# Patient Record
Sex: FEMALE | Race: WHITE
[De-identification: names, ages, dates, MRNs, and addresses within clinical notes are randomized per-mention and may not be internally consistent; named-entity substitution may affect disease eponyms.]

---

## 2017-01-18 ENCOUNTER — HOSPITAL ENCOUNTER (OUTPATIENT)
Dept: HOSPITAL 75 - RAD | Age: 37
End: 2017-01-18
Attending: NURSE PRACTITIONER
Payer: COMMERCIAL

## 2017-01-18 DIAGNOSIS — N63: Primary | ICD-10-CM

## 2017-01-18 PROCEDURE — 76641 ULTRASOUND BREAST COMPLETE: CPT

## 2017-01-18 NOTE — DIAGNOSTIC IMAGING REPORT
EXAMINATION:

Bilateral diagnostic mammogram with a Computer Aided Detection

(CAD) system.



INDICATION:

Right breast pain.



COMPARISON:

Right breast mammogram from 06/19/2012.



FINDINGS:

There is a circumscribed nodule measuring about 1 cm seen in the

outer aspect of the right breast, probably correlating with the

ultrasound abnormality, probably a fibroadenoma and similar to

the 2012 exam. The lesion is better seen on the MLO view and is

not well seen on the CC projection. There is also a well defined

mass inferior to the breast seen in the right true lateral

projection with a minimal component seen on the CC view. This is

better seen on the prior CC projection. It is close to the skin

and may relate to a sebaceous cyst. It measures about 1.5 cm.



The background parenchyma is heterogeneously dense which could

obscure an underlying abnormality. Scattered benign appearing

calcifications are seen.



IMPRESSION:

Dense breasts. No suspicious mass is identified. Annual

screening mammograms at the age of 40 are recommended.



ACR BI-RADS Category 2: Benign findings.

Result letter will be mailed to the patient.

Note: At least 10% of breast cancer is not imaged by mammography.



Dictated by:



Dictated on workstation # [id number]

## 2017-01-18 NOTE — DIAGNOSTIC IMAGING REPORT
EXAMINATION:

Right breast ultrasound.



INDICATION:

Right breast nodule. Breast pain.



COMPARISON STUDY:

04/22/2013.



FINDINGS:

At the 4 o'clock zone 6 cm from the nipple, there is a 1.3 x 0.7

x 1.1 cm mass with lobulated margins and hypoechoic internal

echotexture with slight heterogeneity. There is no shadowing and

it is wider than tall. No internal color Doppler is seen. The

lesion is probably the same lesion marked at the 3 o'clock zone

previously on the 2013 exam and measured 1.2 cm. It is

essentially stable. No other lesion is identified in the

four-quadrant or retroareolar regions.



IMPRESSION:

There is a 1.3 cm lobulated hypoechoic mass at the 4 o'clock

zone 6 cm from the nipple which has not significantly changed

from the 2013 exam, suggestive of benign etiology such as a

fibroadenoma. A correlative mammogram is suggested.



ACR BI-RADS Category 0: Incomplete. (Needs additional imaging

evaluation).



Dictated by:



Dictated on workstation # [id number]

## 2018-02-24 ENCOUNTER — HOSPITAL ENCOUNTER (OUTPATIENT)
Dept: HOSPITAL 75 - LABNPT | Age: 38
End: 2018-02-24
Attending: NURSE PRACTITIONER
Payer: COMMERCIAL

## 2018-02-24 DIAGNOSIS — R30.0: Primary | ICD-10-CM

## 2018-02-24 PROCEDURE — 87186 SC STD MICRODIL/AGAR DIL: CPT

## 2018-02-24 PROCEDURE — 87077 CULTURE AEROBIC IDENTIFY: CPT

## 2018-02-24 PROCEDURE — 87088 URINE BACTERIA CULTURE: CPT

## 2018-08-01 ENCOUNTER — HOSPITAL ENCOUNTER (OUTPATIENT)
Dept: HOSPITAL 75 - RAD | Age: 38
End: 2018-08-01
Attending: NURSE PRACTITIONER
Payer: COMMERCIAL

## 2018-08-01 DIAGNOSIS — E04.2: ICD-10-CM

## 2018-08-01 DIAGNOSIS — N63.14: ICD-10-CM

## 2018-08-01 DIAGNOSIS — N63.13: Primary | ICD-10-CM

## 2018-08-01 PROCEDURE — 76536 US EXAM OF HEAD AND NECK: CPT

## 2018-08-01 PROCEDURE — 77066 DX MAMMO INCL CAD BI: CPT

## 2018-08-01 NOTE — DIAGNOSTIC IMAGING REPORT
INDICATION: 

Palpable lump in the inferior right breast.



COMPARISON:    

01/18/2017 and 06/19/2012.



TECHNIQUE: 

2D and 3D bilateral diagnostic mammography was performed with

CAD.



FINDINGS:

Both breasts are heterogeneously dense, limiting the sensitivity

of mammography. There is a circumscribed mass just below the skin

surface at the area of palpable abnormality just below the right

breast. This has been present on prior mammograms dating back to

2012. This may represent a sebaceous cyst. It measures 2.4 cm in

size. The remainder of the breasts is unremarkable. No

malignant-appearing microcalcifications are seen. The axillae are

unremarkable.



IMPRESSION:  

Circumscribed mass in the superficial tissues at the area of

palpable abnormality inferior to the right breast. This most

likely represents a sebaceous cyst. Further evaluation of this

area with ultrasound is recommended.



ACR BI-RADS Category 0: Incomplete. (Needs additional imaging

evaluation).

Result letter will be mailed to the patient.

Note: At least 10% of breast cancer is not imaged by mammography.



Dictated by: 



  Dictated on workstation # [id number]

## 2018-08-01 NOTE — DIAGNOSTIC IMAGING REPORT
INDICATION: Palpable lump in the neck.



FINDINGS: The right lobe of the thyroid measures 5.0 x 1.5 x 1.7

cm and the left lobe measures 5.1 x 1.3 x 1.5 cm. There are

numerous hypoechoic subcentimeter nodules within both lobes of

the thyroid gland measuring range from 3-7 mm. No dominant

thyroid mass is identified. At the area of palpable abnormality,

there is a circumscribed hyperechoic mass just below the skin

surface anterior to the thyroid isthmus measuring 2.0 x 0.8 x 1.5

cm. No internal vascularity is seen.



IMPRESSION:

1. Multiple bilateral subcentimeter thyroid nodules. No dominant

thyroid mass is detected.

2. Circumscribed, hyperechoic homogeneous mass in the right

paramidline of the neck immediately anterior to the thyroid. This

appears solid but has fairly benign features. Exact etiology is

indeterminate. Close followup is recommended. Consideration could

be given to excisional biopsy.



Dictated by: 



  Dictated on workstation # [id number]

## 2018-08-01 NOTE — DIAGNOSTIC IMAGING REPORT
INDICATION: 

Palpable lump in the inferior right breast. This study is

performed for further evaluation.



COMPARISON: 

Correlation is made with the diagnostic mammogram from earlier

this same day as well as the prior right breast ultrasound from

01/18/2017.



FINDINGS:

There is a circumscribed hypoechoic mass just below the skin

surface at the 6 o'clock location of the right breast 7 cm from

the nipple. This does correspond to the palpable abnormality.

This measures 2.7 x 1.2 x 2.3 cm. No internal vascularity is

seen. There is posterior acoustic enhancement present. In

addition, the slightly lobulated hypoechoic mass at the 4 clock

location of the right breast 6 cm from the nipple is again noted

measuring 13 mm x 5 mm x 10 mm. This is similar in size to the

prior study and is most consistent with a fibroadenoma. No other

masses are seen.



IMPRESSION:   

1. Stable lobulated hypoechoic nodule at the 4 o'clock location

of the right breast which is suggestive of a fibroadenoma.



2. Ovoid, circumscribed hypoechoic mass at the 6 o'clock location

of the right breast 7 cm from the nipple. The features are most

suggestive of either a fibroadenoma or sebaceous cyst. This does

appear to be benign.



3. No other abnormality is seen.



Dictated by: 



  Dictated on workstation # [id number]

## 2018-09-10 ENCOUNTER — HOSPITAL ENCOUNTER (OUTPATIENT)
Dept: HOSPITAL 75 - PREOP | Age: 38
Discharge: HOME | End: 2018-09-10
Attending: SURGERY
Payer: COMMERCIAL

## 2018-09-10 VITALS — BODY MASS INDEX: 21.98 KG/M2 | HEIGHT: 68 IN | WEIGHT: 145 LBS

## 2018-09-10 DIAGNOSIS — Z01.818: Primary | ICD-10-CM

## 2018-09-13 ENCOUNTER — HOSPITAL ENCOUNTER (OUTPATIENT)
Dept: HOSPITAL 75 - SDC | Age: 38
Discharge: HOME | End: 2018-09-13
Attending: SURGERY
Payer: COMMERCIAL

## 2018-09-13 VITALS — DIASTOLIC BLOOD PRESSURE: 73 MMHG | SYSTOLIC BLOOD PRESSURE: 109 MMHG

## 2018-09-13 VITALS — WEIGHT: 145 LBS | HEIGHT: 68 IN | BODY MASS INDEX: 21.98 KG/M2

## 2018-09-13 VITALS — DIASTOLIC BLOOD PRESSURE: 79 MMHG | SYSTOLIC BLOOD PRESSURE: 119 MMHG

## 2018-09-13 VITALS — SYSTOLIC BLOOD PRESSURE: 116 MMHG | DIASTOLIC BLOOD PRESSURE: 77 MMHG

## 2018-09-13 VITALS — SYSTOLIC BLOOD PRESSURE: 109 MMHG | DIASTOLIC BLOOD PRESSURE: 73 MMHG

## 2018-09-13 DIAGNOSIS — L72.0: Primary | ICD-10-CM

## 2018-09-13 PROCEDURE — 87081 CULTURE SCREEN ONLY: CPT

## 2018-09-13 PROCEDURE — 84703 CHORIONIC GONADOTROPIN ASSAY: CPT

## 2018-09-13 PROCEDURE — 88304 TISSUE EXAM BY PATHOLOGIST: CPT

## 2018-09-13 NOTE — DISCHARGE INST-SIMPLE/STANDARD
Discharge Inst-Standard


Discharge Medications


New, Converted or Re-Newed RX:  RX on Chart





Patient Instructions/Follow Up


Plan of Care/Instructions/FU:  


Dressings off in 48 hours. F/U if needed. We would call once path available


Activity as Tolerated:  Yes


Discharge Diet:  No Restrictions











ROXANNA NGUYEN MD Sep 13, 2018 11:48

## 2018-09-13 NOTE — OPERATIVE REPORT
Operative Report


Date of Procedure/Surgery


Sep 13, 2018


Surgeon (s)


ROXANNA NGUYEN MD


Assistant (s):  N/A





Post-Operative Diagnosis





same





Procedure Performed





1.excision of pretracheal lump


2.  Excision of sebaceous cyst from right inframammary region





Description of Procedure


Anesthesia Type:  General


Estimated blood loss (mL):  minimal


Specimen(s) collected/removed


pretracheal lump and sebaceous cyst


Description of the Procedure


Indication for the procedures: This lady presented with a palpable lump over 

the right inframammary region with a negative mammogram and a 2 cm lesion over 

the pretracheal region.  With regard to the latter, evaluation was negative for 

any thyroglossal cyst.  However, the possibility of this condition was 

discussed explicitly..  She was offered excision of both lesions.  Informed 

consent was obtained after reviewing the operative details and complications of 

wound infection and recurrence.





Description of the procedures: She was placed supine on the operative table and 

general anesthesia induced.  Antibiotics were administered intravenously as 

prophylaxis against wound infection.  Sequential compression devices were 

placed around her legs, to minimize the risk of venous thrombosis.





Her neck and right inframammary region was prepared and draped in the usual 

sterile manner.





1.Excision of lesion from the pretracheal region: Preemptive analgesia was 

established using 0.5 percent Marcaine with epinephrine.  A 3 cm transverse 

incision was made and the lesion, which was quite superficial and anterior to 

the strap muscles without any obvious communication to the thyroid gland, was 

isolated and excised intact.  It was sent for histological examination.  

Hemostasis was achieved using cautery and incision closed using 3-0 Vicryl for 

the definitive 4-0 Vicryl for skin, in a subcuticular fashion.





2.Excision of sebaceous cystright inframammary region: preemptive analgesia 

was established in a similar fashion.  An elliptical incision, about 4 cm in 

length was made, in a transverse fashion and the lump, which are not be a 

classic sebaceous cyst, excised.  Hemostasis was achieved using cautery and the 

incision closed using 3-0 Vicryl for the subcutaneous tissue and 4-0 Vicryl for 

skin, in a subcuticular fashion.  Steri-Strips and a nonadherent dressing were 

then applied.





She tolerated the procedures well, was extubated in the operating room and 

taken to the recovery room in a stable condition.


Findings of the Procedure


See op report





Allergies and Home Medications


Allergies


Coded Allergies:  


     Penicillins (Verified  Allergy, Unknown, HIVES, 9/10/18)


     Sulfa (Sulfonamide Antibiotics) (Verified  Allergy, Unknown, HIVES, 9/10/18

)





Home Medications


Spironolactone 50 Mg Tablet, 50 MG PO BID, (Reported)


Tramadol HCl 50 Mg Tablet, 50 MG PO Q12H PRN for PAIN-MILD TO MODERATE


   Prescribed by: ROXANNA NGUYEN on 9/13/18 1147





Patient Home Medication List


Home Medication List Reviewed:  Yes











ROXANNA NGUEYN MD Sep 13, 2018 12:41

## 2018-09-13 NOTE — ANESTHESIA-GENERAL POST-OP
General


Patient Condition


Mental Status/LOC:  Same as Preop


Cardiovascular:  Satisfactory


Nausea/Vomiting:  Absent


Respiratory:  Satisfactory


Pain:  Controlled


Complications:  Absent





Post Op Complications


Complications


None





Follow Up Care/Instructions


Patient Instructions


None needed.





Anesthesia/Patient Condition


Patient Condition


Patient is doing well, no complaints, stable vital signs, no apparent adverse 

anesthesia problems.   


No complications reported per nursing.











LADI BURGOS CRNA Sep 13, 2018 13:53

## 2018-09-13 NOTE — PROGRESS NOTE-PRE OPERATIVE
Pre-Operative Progress Note


H&P Reviewed


The H&P was reviewed, patient examined and no changes noted.


Date Seen by Provider:  Aug 16, 2018


Time Seen by Provider:  11:00


Date H&P Reviewed:  Sep 13, 2018


Time H&P Reviewed:  10:46


Pre-Operative Diagnosis:  Pre-tracheal lump. left breast lump











ROXANNA NGUYEN MD Sep 13, 2018 10:46

## 2023-08-25 ENCOUNTER — HOSPITAL ENCOUNTER (OUTPATIENT)
Dept: HOSPITAL 75 - RAD | Age: 43
Discharge: STILL ADMITTED OR ADMINISTRATIVE | End: 2023-08-25
Attending: NURSE PRACTITIONER
Payer: COMMERCIAL

## 2023-08-25 DIAGNOSIS — Z12.31: Primary | ICD-10-CM

## 2023-08-25 PROCEDURE — 77067 SCR MAMMO BI INCL CAD: CPT

## 2023-08-25 PROCEDURE — 77063 BREAST TOMOSYNTHESIS BI: CPT

## 2023-08-28 NOTE — DIAGNOSTIC IMAGING REPORT
INDICATION: 

Routine screening.



COMPARISON: 

10/18/2021 and 08/23/2019.



TECHNIQUE: 

2D and 3D bilateral screening mammography was performed with CAD.



FINDINGS:

Both breasts are heterogeneously dense, limiting the sensitivity

of mammography. The parenchymal pattern is stable. No dominant

mass or malignant-appearing microcalcifications are seen. There

are benign calcifications bilaterally. The axillae are

unremarkable.



IMPRESSION: 

No mammographic features suspicious for malignancy are

identified.



ACR BI-RADS Category 2: Benign findings.

Result letter will be mailed to the patient.

Note: At least 10% of breast cancer is not imaged by mammography.



Dictated by: 



  Dictated on workstation # [id number]

## 2023-10-11 ENCOUNTER — HOSPITAL ENCOUNTER (OUTPATIENT)
Dept: HOSPITAL 75 - PREOP | Age: 43
LOS: 2 days | Discharge: HOME | End: 2023-10-13
Attending: SURGERY
Payer: COMMERCIAL

## 2023-10-11 VITALS — HEIGHT: 68.98 IN | BODY MASS INDEX: 23.25 KG/M2 | WEIGHT: 156.97 LBS

## 2023-10-11 DIAGNOSIS — Z01.818: Primary | ICD-10-CM

## 2023-10-18 ENCOUNTER — HOSPITAL ENCOUNTER (OUTPATIENT)
Dept: HOSPITAL 75 - ENDO | Age: 43
Discharge: HOME | End: 2023-10-18
Attending: SURGERY
Payer: COMMERCIAL

## 2023-10-18 VITALS — SYSTOLIC BLOOD PRESSURE: 110 MMHG | DIASTOLIC BLOOD PRESSURE: 68 MMHG

## 2023-10-18 VITALS — DIASTOLIC BLOOD PRESSURE: 68 MMHG | SYSTOLIC BLOOD PRESSURE: 110 MMHG

## 2023-10-18 VITALS — DIASTOLIC BLOOD PRESSURE: 54 MMHG | SYSTOLIC BLOOD PRESSURE: 84 MMHG

## 2023-10-18 VITALS — WEIGHT: 156.97 LBS | BODY MASS INDEX: 23.25 KG/M2 | HEIGHT: 68.98 IN

## 2023-10-18 VITALS — SYSTOLIC BLOOD PRESSURE: 89 MMHG | DIASTOLIC BLOOD PRESSURE: 55 MMHG

## 2023-10-18 VITALS — SYSTOLIC BLOOD PRESSURE: 115 MMHG | DIASTOLIC BLOOD PRESSURE: 79 MMHG

## 2023-10-18 DIAGNOSIS — Z12.11: Primary | ICD-10-CM

## 2023-10-18 DIAGNOSIS — K64.8: ICD-10-CM

## 2023-10-18 DIAGNOSIS — K64.4: ICD-10-CM

## 2023-10-18 NOTE — PROGRESS NOTE-POST OPERATIVE
Post-Operative Progess Note


Surgeon (s)/Assistant (s)


Surgeon


ANDRESSA GERARD MD


Assistant:  none





Pre-Operative Diagnosis


screening colo





Post-Operative Diagnosis





mild chronic stage 2 ext and int hemorrhoids.





Procedure & Operative Findings


Date of Procedure


10/18/23


Procedure Performed/Findings


colonoscopy


Anesthesia Type


mac





Estimated Blood Loss


Estimated blood loss (mL):  minimal





Specimens/Packing


Specimens Removed


none











ANDRESSA GERARD MD                Oct 18, 2023 12:03

## 2023-10-18 NOTE — OPERATIVE REPORT
DATE OF SERVICE: 10/18/2023



ATTENDING PRIMARY CARE PHYSICIAN:Shea Dan MD



PREOPERATIVE DIAGNOSIS:

Screening colonoscopy.



POSTOPERATIVE DIAGNOSES:

Mild chronic stage II external and internal hemorrhoids.



PROCEDURE:

Colonoscopy.



SURGEON:

Andressa Gerard MD



ANESTHESIA:

Monitored anesthesia care.



ESTIMATED BLOOD LOSS:

Minimal.



FINDINGS:

Mild chronic stage II external and internal hemorrhoids.



DISPOSITION:

The patient tolerated the procedure well.



INDICATIONS:

The patient is a 43-year-old female referred over to us for screening 

colonoscopy.  She is otherwise doing well, does not report any major issues with

diarrhea, nor constipation as well as no red blood per rectum, nor any dark 

tarry stools.  She also does not report any family history of colon cancer.



DESCRIPTION OF PROCEDURE:

The patient was brought to the endoscopy suite and laid in the left lateral 

decubitus position.  After adequate IV pain and sedative medications and 

monitored anesthesia care, a digital rectal examination was performed.  Mild 

chronic stage II external and internal hemorrhoids identified, not actively 

edematous nor inflamed and no bleeding.  Normal sphincter tone was felt and 

there were no palpable masses.



The endoscope was then intubated into the anus, rectum gently insufflated.  The 

endoscope was then advanced through the valves of Martin of the rectum with no 

polyps or any neoplasms identified.  We then proceeded to the sigmoid colon 

where no diverticulosis identified.  The endoscope was then advanced through the

remainder of the descending, transverse and ascending colon to the cecum, which 

were normal.  No polyps or any neoplasms identified.  The endoscope was then 

slowly withdrawn while taking a second look and suctioning of residual air with 

no additional findings.



The patient tolerated the procedure well.  We will recommend continued medical 

management with a high-fiber diet with addition of a fiber supplement, which 

should equal or exceed 25 grams daily as well as significant amounts of water to

promote soft consistency stools on a daily basis.  She is asymptomatic, she does

not need another colonoscopy for another 10 years.





Job ID: [id number]

DocumentID: [id number]

Dictated Date: 10/18/2023 12:00:12

Transcription Date: 10/18/2023 19:40:00

Dictated By: ANDRESSA GERARD MD

## 2023-10-18 NOTE — PROGRESS NOTE-PRE OPERATIVE
Pre-Operative Progress Note


Date of Available H&P:  Oct 18, 2023


Date H&P Reviewed:  Oct 18, 2023


Time H&P Reviewed:  10:30


History & Physical:  No changes noted


Pre-Operative Diagnosis:  screening colo











ANDRESSA GERARD MD                Oct 18, 2023 11:06

## 2023-10-18 NOTE — DISCHARGE INST-SURGICAL
D/C Lap Instructions-RAJANI


Follow Up 





Activity as tolerated








High Fiber Diet 25g or more per day





Avoid Alcohol, Caffeine, Spicy Greasy and Acid foods.





Drink 64 fluid oz or more of fluids per day.





Symptoms to Report: Fever over 101 degree F, Nausea/Vomiting 


If any problems/questions: Contact your physician or go to Emergency Room











ANDRESSA GERARD MD                Oct 18, 2023 11:08

## 2023-10-18 NOTE — ANESTHESIA-GENERAL POST-OP
MAC


Patient Condition


Mental Status/LOC:  Same as Preop


Cardiovascular:  Satisfactory


Nausea/Vomiting:  Absent


Respiratory:  Satisfactory


Pain:  Controlled


Complications:  Absent





Post Op Complications


Complications


None





Follow Up Care/Instructions


Patient Instructions


None needed.





Anesthesiology Discharge Order


Discharge Order


Patient is doing well, no complaints, stable vital signs, no apparent adverse 

anesthesia problems.   


No complications reported per nursing.











BONITA HUNT CRNA            Oct 18, 2023 11:53